# Patient Record
Sex: FEMALE | Race: ASIAN | NOT HISPANIC OR LATINO | URBAN - METROPOLITAN AREA
[De-identification: names, ages, dates, MRNs, and addresses within clinical notes are randomized per-mention and may not be internally consistent; named-entity substitution may affect disease eponyms.]

---

## 2018-10-03 ENCOUNTER — INPATIENT (INPATIENT)
Facility: HOSPITAL | Age: 83
LOS: 0 days | Discharge: HOME | DRG: 185 | End: 2018-10-04
Attending: SURGERY | Admitting: SURGERY
Payer: COMMERCIAL

## 2018-10-03 VITALS
DIASTOLIC BLOOD PRESSURE: 70 MMHG | SYSTOLIC BLOOD PRESSURE: 132 MMHG | OXYGEN SATURATION: 98 % | TEMPERATURE: 98 F | HEART RATE: 80 BPM | HEIGHT: 60 IN | RESPIRATION RATE: 18 BRPM | WEIGHT: 119.93 LBS

## 2018-10-03 DIAGNOSIS — S22.41XB: ICD-10-CM

## 2018-10-03 LAB
ALBUMIN SERPL ELPH-MCNC: 4 G/DL — SIGNIFICANT CHANGE UP (ref 3.3–5)
ALP SERPL-CCNC: 54 U/L — SIGNIFICANT CHANGE UP (ref 40–120)
ALT FLD-CCNC: 19 U/L — SIGNIFICANT CHANGE UP (ref 10–45)
ANION GAP SERPL CALC-SCNC: 10 MMOL/L — SIGNIFICANT CHANGE UP (ref 5–17)
APTT BLD: 29 SEC — SIGNIFICANT CHANGE UP (ref 27.5–37.4)
AST SERPL-CCNC: 28 U/L — SIGNIFICANT CHANGE UP (ref 10–40)
BASOPHILS # BLD AUTO: SIGNIFICANT CHANGE UP K/UL (ref 0–0.2)
BASOPHILS NFR BLD AUTO: SIGNIFICANT CHANGE UP % (ref 0–2)
BILIRUB SERPL-MCNC: 0.3 MG/DL — SIGNIFICANT CHANGE UP (ref 0.2–1.2)
BUN SERPL-MCNC: 17 MG/DL — SIGNIFICANT CHANGE UP (ref 7–23)
CALCIUM SERPL-MCNC: 9.6 MG/DL — SIGNIFICANT CHANGE UP (ref 8.4–10.5)
CHLORIDE SERPL-SCNC: 104 MMOL/L — SIGNIFICANT CHANGE UP (ref 96–108)
CO2 SERPL-SCNC: 26 MMOL/L — SIGNIFICANT CHANGE UP (ref 22–31)
CREAT SERPL-MCNC: 0.72 MG/DL — SIGNIFICANT CHANGE UP (ref 0.5–1.3)
EOSINOPHIL # BLD AUTO: SIGNIFICANT CHANGE UP K/UL (ref 0–0.5)
EOSINOPHIL NFR BLD AUTO: SIGNIFICANT CHANGE UP % (ref 0–6)
GLUCOSE SERPL-MCNC: 89 MG/DL — SIGNIFICANT CHANGE UP (ref 70–99)
HCT VFR BLD CALC: 39.7 % — SIGNIFICANT CHANGE UP (ref 34.5–45)
HGB BLD-MCNC: 13.6 G/DL — SIGNIFICANT CHANGE UP (ref 11.5–15.5)
INR BLD: 0.96 RATIO — SIGNIFICANT CHANGE UP (ref 0.88–1.16)
LYMPHOCYTES # BLD AUTO: SIGNIFICANT CHANGE UP % (ref 13–44)
LYMPHOCYTES # BLD AUTO: SIGNIFICANT CHANGE UP K/UL (ref 1–3.3)
MCHC RBC-ENTMCNC: 31 PG — SIGNIFICANT CHANGE UP (ref 27–34)
MCHC RBC-ENTMCNC: 34.3 GM/DL — SIGNIFICANT CHANGE UP (ref 32–36)
MCV RBC AUTO: 90.2 FL — SIGNIFICANT CHANGE UP (ref 80–100)
MONOCYTES # BLD AUTO: SIGNIFICANT CHANGE UP K/UL (ref 0–0.9)
MONOCYTES NFR BLD AUTO: SIGNIFICANT CHANGE UP % (ref 2–14)
NEUTROPHILS # BLD AUTO: SIGNIFICANT CHANGE UP K/UL (ref 1.8–7.4)
NEUTROPHILS NFR BLD AUTO: SIGNIFICANT CHANGE UP % (ref 43–77)
PLATELET # BLD AUTO: 250 K/UL — SIGNIFICANT CHANGE UP (ref 150–400)
POTASSIUM SERPL-MCNC: 3.9 MMOL/L — SIGNIFICANT CHANGE UP (ref 3.5–5.3)
POTASSIUM SERPL-SCNC: 3.9 MMOL/L — SIGNIFICANT CHANGE UP (ref 3.5–5.3)
PROT SERPL-MCNC: 7.7 G/DL — SIGNIFICANT CHANGE UP (ref 6–8.3)
PROTHROM AB SERPL-ACNC: 10.4 SEC — SIGNIFICANT CHANGE UP (ref 9.8–12.7)
RBC # BLD: 4.4 M/UL — SIGNIFICANT CHANGE UP (ref 3.8–5.2)
RBC # FLD: 12.3 % — SIGNIFICANT CHANGE UP (ref 10.3–14.5)
SODIUM SERPL-SCNC: 140 MMOL/L — SIGNIFICANT CHANGE UP (ref 135–145)
WBC # BLD: 7 K/UL — SIGNIFICANT CHANGE UP (ref 3.8–10.5)
WBC # FLD AUTO: 7 K/UL — SIGNIFICANT CHANGE UP (ref 3.8–10.5)

## 2018-10-03 PROCEDURE — 71045 X-RAY EXAM CHEST 1 VIEW: CPT | Mod: 26

## 2018-10-03 PROCEDURE — 99285 EMERGENCY DEPT VISIT HI MDM: CPT

## 2018-10-03 PROCEDURE — 99221 1ST HOSP IP/OBS SF/LOW 40: CPT

## 2018-10-03 PROCEDURE — 74177 CT ABD & PELVIS W/CONTRAST: CPT | Mod: 26

## 2018-10-03 PROCEDURE — 71260 CT THORAX DX C+: CPT | Mod: 26

## 2018-10-03 PROCEDURE — 73630 X-RAY EXAM OF FOOT: CPT | Mod: 26,RT

## 2018-10-03 RX ORDER — ACETAMINOPHEN 500 MG
1000 TABLET ORAL ONCE
Qty: 0 | Refills: 0 | Status: COMPLETED | OUTPATIENT
Start: 2018-10-03 | End: 2018-10-03

## 2018-10-03 RX ORDER — SODIUM CHLORIDE 9 MG/ML
1000 INJECTION INTRAMUSCULAR; INTRAVENOUS; SUBCUTANEOUS ONCE
Qty: 0 | Refills: 0 | Status: COMPLETED | OUTPATIENT
Start: 2018-10-03 | End: 2018-10-03

## 2018-10-03 RX ORDER — LIDOCAINE 4 G/100G
1 CREAM TOPICAL EVERY 24 HOURS
Qty: 0 | Refills: 0 | Status: DISCONTINUED | OUTPATIENT
Start: 2018-10-03 | End: 2018-10-04

## 2018-10-03 RX ORDER — SODIUM CHLORIDE 9 MG/ML
3 INJECTION INTRAMUSCULAR; INTRAVENOUS; SUBCUTANEOUS ONCE
Qty: 0 | Refills: 0 | Status: COMPLETED | OUTPATIENT
Start: 2018-10-03 | End: 2018-10-03

## 2018-10-03 RX ORDER — ENOXAPARIN SODIUM 100 MG/ML
40 INJECTION SUBCUTANEOUS DAILY
Qty: 0 | Refills: 0 | Status: DISCONTINUED | OUTPATIENT
Start: 2018-10-03 | End: 2018-10-04

## 2018-10-03 RX ORDER — ACETAMINOPHEN 500 MG
650 TABLET ORAL EVERY 6 HOURS
Qty: 0 | Refills: 0 | Status: DISCONTINUED | OUTPATIENT
Start: 2018-10-03 | End: 2018-10-04

## 2018-10-03 RX ORDER — TRAMADOL HYDROCHLORIDE 50 MG/1
25 TABLET ORAL EVERY 6 HOURS
Qty: 0 | Refills: 0 | Status: DISCONTINUED | OUTPATIENT
Start: 2018-10-03 | End: 2018-10-04

## 2018-10-03 RX ORDER — IBUPROFEN 200 MG
400 TABLET ORAL EVERY 6 HOURS
Qty: 0 | Refills: 0 | Status: DISCONTINUED | OUTPATIENT
Start: 2018-10-03 | End: 2018-10-04

## 2018-10-03 RX ADMIN — SODIUM CHLORIDE 1000 MILLILITER(S): 9 INJECTION INTRAMUSCULAR; INTRAVENOUS; SUBCUTANEOUS at 16:43

## 2018-10-03 RX ADMIN — SODIUM CHLORIDE 3 MILLILITER(S): 9 INJECTION INTRAMUSCULAR; INTRAVENOUS; SUBCUTANEOUS at 16:43

## 2018-10-03 RX ADMIN — Medication 400 MILLIGRAM(S): at 19:35
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
## 2018-10-03 NOTE — ED PROVIDER NOTE - CARE PLAN
Principal Discharge DX:	Open fracture of multiple ribs of right side, initial encounter  Assessment and plan of treatment:	Rest, increase activity as tolerated. REturn to the ER for any concerns such an uncontrolled pain shortness of breath , chest pain, dizziness or any other concerns  take tylenol for pain as needed  Ice packs to all sore areas  Use incentive spirometer 10x n hour while awake  follow up with your primary physician  in the next week Principal Discharge DX:	Rib fractures  Assessment and plan of treatment:	Rest, increase activity as tolerated. REturn to the ER for any concerns such an uncontrolled pain shortness of breath , chest pain, dizziness or any other concerns  take tylenol for pain as needed  Ice packs to all sore areas  Use incentive spirometer 10x n hour while awake  follow up with your primary physician  in the next week Principal Discharge DX:	Rib fractures  Goal:	R 4-6  Assessment and plan of treatment:	Rest, increase activity as tolerated. REturn to the ER for any concerns such an uncontrolled pain shortness of breath , chest pain, dizziness or any other concerns  take tylenol for pain as needed  Ice packs to all sore areas  Use incentive spirometer 10x n hour while awake  follow up with your primary physician  in the next week
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
## 2018-10-03 NOTE — ED PROVIDER NOTE - PLAN OF CARE
Rest, increase activity as tolerated. REturn to the ER for any concerns such an uncontrolled pain shortness of breath , chest pain, dizziness or any other concerns  take tylenol for pain as needed  Ice packs to all sore areas  Use incentive spirometer 10x n hour while awake  follow up with your primary physician  in the next week R 4-6

## 2018-10-04 ENCOUNTER — TRANSCRIPTION ENCOUNTER (OUTPATIENT)
Age: 83
End: 2018-10-04

## 2018-10-04 VITALS — SYSTOLIC BLOOD PRESSURE: 126 MMHG | HEART RATE: 59 BPM | DIASTOLIC BLOOD PRESSURE: 71 MMHG
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
## 2018-10-04 DIAGNOSIS — S22.41XA MULTIPLE FRACTURES OF RIBS, RIGHT SIDE, INITIAL ENCOUNTER FOR CLOSED FRACTURE: ICD-10-CM

## 2018-10-04 DIAGNOSIS — E05.90 THYROTOXICOSIS, UNSPECIFIED WITHOUT THYROTOXIC CRISIS OR STORM: ICD-10-CM

## 2018-10-04 DIAGNOSIS — Z29.9 ENCOUNTER FOR PROPHYLACTIC MEASURES, UNSPECIFIED: ICD-10-CM

## 2018-10-04 DIAGNOSIS — E78.5 HYPERLIPIDEMIA, UNSPECIFIED: ICD-10-CM

## 2018-10-04 DIAGNOSIS — Z79.899 OTHER LONG TERM (CURRENT) DRUG THERAPY: ICD-10-CM

## 2018-10-04 DIAGNOSIS — I10 ESSENTIAL (PRIMARY) HYPERTENSION: ICD-10-CM

## 2018-10-04 PROCEDURE — 85610 PROTHROMBIN TIME: CPT

## 2018-10-04 PROCEDURE — 99285 EMERGENCY DEPT VISIT HI MDM: CPT | Mod: 25

## 2018-10-04 PROCEDURE — 97161 PT EVAL LOW COMPLEX 20 MIN: CPT

## 2018-10-04 PROCEDURE — 85730 THROMBOPLASTIN TIME PARTIAL: CPT

## 2018-10-04 PROCEDURE — 71045 X-RAY EXAM CHEST 1 VIEW: CPT

## 2018-10-04 PROCEDURE — 80053 COMPREHEN METABOLIC PANEL: CPT

## 2018-10-04 PROCEDURE — 73630 X-RAY EXAM OF FOOT: CPT

## 2018-10-04 PROCEDURE — 71260 CT THORAX DX C+: CPT

## 2018-10-04 PROCEDURE — 74177 CT ABD & PELVIS W/CONTRAST: CPT

## 2018-10-04 PROCEDURE — 99223 1ST HOSP IP/OBS HIGH 75: CPT

## 2018-10-04 PROCEDURE — 85027 COMPLETE CBC AUTOMATED: CPT

## 2018-10-04 PROCEDURE — 96374 THER/PROPH/DIAG INJ IV PUSH: CPT | Mod: XU

## 2018-10-04 RX ORDER — SIMVASTATIN 20 MG/1
50 TABLET, FILM COATED ORAL
Qty: 0 | Refills: 0 | COMMUNITY

## 2018-10-04 RX ORDER — PROPYLTHIOURACIL 50 MG
1 TABLET ORAL
Qty: 0 | Refills: 0 | COMMUNITY

## 2018-10-04 RX ORDER — LIDOCAINE 4 G/100G
0 CREAM TOPICAL
Qty: 0 | Refills: 0 | COMMUNITY

## 2018-10-04 RX ORDER — TRAMADOL HYDROCHLORIDE 50 MG/1
0.5 TABLET ORAL
Qty: 10 | Refills: 0 | OUTPATIENT
Start: 2018-10-04

## 2018-10-04 RX ORDER — ACETAMINOPHEN 500 MG
1 TABLET ORAL
Qty: 0 | Refills: 0 | COMMUNITY

## 2018-10-04 RX ORDER — ASPIRIN/CALCIUM CARB/MAGNESIUM 324 MG
1 TABLET ORAL
Qty: 0 | Refills: 0 | COMMUNITY

## 2018-10-04 RX ORDER — ALENDRONATE SODIUM 70 MG/1
0 TABLET ORAL
Qty: 0 | Refills: 0 | COMMUNITY

## 2018-10-04 RX ORDER — IBUPROFEN 200 MG
1 TABLET ORAL
Qty: 0 | Refills: 0 | COMMUNITY
Start: 2018-10-04

## 2018-10-04 RX ORDER — ACETAMINOPHEN 500 MG
0 TABLET ORAL
Qty: 0 | Refills: 0 | COMMUNITY

## 2018-10-04 RX ADMIN — Medication 650 MILLIGRAM(S): at 09:44

## 2018-10-04 RX ADMIN — LIDOCAINE 1 PATCH: 4 CREAM TOPICAL at 00:58

## 2018-10-04 RX ADMIN — ENOXAPARIN SODIUM 40 MILLIGRAM(S): 100 INJECTION SUBCUTANEOUS at 12:03

## 2018-10-04 RX ADMIN — Medication 650 MILLIGRAM(S): at 09:14

## 2018-10-04 RX ADMIN — Medication 400 MILLIGRAM(S): at 00:06

## 2018-10-04 RX ADMIN — Medication 1000 MILLIGRAM(S): at 00:01

## 2018-10-04 RX ADMIN — Medication 400 MILLIGRAM(S): at 07:28

## 2018-10-04 RX ADMIN — Medication 400 MILLIGRAM(S): at 00:49

## 2018-10-04 RX ADMIN — LIDOCAINE 1 PATCH: 4 CREAM TOPICAL at 12:03

## 2018-10-04 RX ADMIN — Medication 400 MILLIGRAM(S): at 12:03

## 2018-10-04 RX ADMIN — Medication 400 MILLIGRAM(S): at 06:58

## 2018-10-04 NOTE — H&P ADULT - ATTENDING COMMENTS
Pt seen and examined. Agree with A/P. Admit to ATP, floor. Pain control, pulmonary toilet, OOB, diet, may need PT.

## 2018-10-04 NOTE — DISCHARGE NOTE ADULT - PATIENT PORTAL LINK FT
You can access the [facility] Patient Portal, offered by [facility], by registering with the following website: [email or web address]

## 2018-10-04 NOTE — DISCHARGE NOTE ADULT - PLAN OF CARE
Pain control, recovery 1.  Regular diet  2.  Activity as tolerated  3.  Follow-up with Dr. Kaur within 1-2 weeks.  Please call office for appointment.    Please follow up with your primary care physician.  Please schedule an appointment with your primary care provider within two weeks. 1.  Regular diet  2.  Activity as tolerated  3.  Follow-up with Dr. Kaur within 1-2 weeks.  Please call office for appointment.  Call office or return to emergency room if your pain is not controlled, if you are short of breath, or if you have chest pain.  Please follow up with your primary care physician.  Please schedule an appointment with your primary care provider within two weeks. 1.  Regular diet  2.  Activity as tolerated  3.  Follow-up with Dr. Kaur within 1-2 weeks.  Please call office for appointment.  Call office or return to emergency room if your pain is not controlled, if you are short of breath, or if you have chest pain.  Please follow up with your primary care physician.  Please schedule an appointment with your primary care provider within two weeks.    You may purchase over the counter lidoderm patches such as SalonPas for pain relief.

## 2018-10-04 NOTE — PHYSICAL THERAPY INITIAL EVALUATION ADULT - DISCHARGE DISPOSITION, PT EVAL
assist as needed especially on stair. Pt appears at functional baseline, no skilled PT required, Pt cleared for D/C from PT standpoint, SADIE Wright and SANGITA Worthy aware./home w/ assist

## 2018-10-04 NOTE — CONSULT NOTE ADULT - PROBLEM SELECTOR RECOMMENDATION 5
Spoke with pt's Pharmacy, [facility] in [place] ([phone]). Medications reconciled
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
## 2018-10-04 NOTE — H&P ADULT - NSHPLABSRESULTS_GEN_ALL_CORE
LABS:             12.6   5.9   )-----------( 238      ( 03 Oct 2018 17:02 )             37.7       10-03    140  |  104  |  17  ----------------------------<  89  3.9   |  26  |  0.72    Ca    9.6      03 Oct 2018 16:46    TPro  7.7  /  Alb  4.0  /  TBili  0.3  /  DBili  x   /  AST  28  /  ALT  19  /  AlkPhos  54  10-03    < from: CT Abdomen and Pelvis w/ IV Cont (10.03.18 @ 18:45) >    FINDINGS:    CHEST:     LUNGS AND LARGE AIRWAYS: Patent central airways. Bibasilar subsegmental   atelectasis. Left upper lobe calcified granuloma.  PLEURA: No pleural effusion.  VESSELS: Atherosclerotic calcifications.  HEART: Heart size is enlarged. Coronary artery calcifications. No   pericardial effusion.  MEDIASTINUM AND HARMONY: No lymphadenopathy.  CHEST WALL AND LOWER NECK: Enlarged and heterogeneous thyroid with   multiple subcentimeter nodules and substernal extension.    ABDOMEN AND PELVIS:    Multiple images are degraded secondary to motion.    LIVER: Subcentimeter hypodensity in right hepatic lobe is too small to   characterize.  BILE DUCTS: Normal caliber.  GALLBLADDER: Within normal limits.  SPLEEN: Within normal limits.  PANCREAS: Diffuse main pancreatic duct enlargement measuring up to 4 mm   in pancreatic body with gradual tapering towards the tail.   ADRENALS: Within normal limits.  KIDNEYS/URETERS: Within normal limits.    BLADDER: Within normal limits.  REPRODUCTIVE ORGANS: Hysterectomy.    BOWEL: No bowel obstruction. Cecal diverticulum. Appendix is not   visualized, however there are no regional inflammatory changes. Small   hiatal hernia.  PERITONEUM: No ascites.  VESSELS:  Atherosclerotic calcifications.  RETROPERITONEUM: No lymphadenopathy.    ABDOMINAL WALL: Calcified injection granulomas overlying the bilateral   gluteal musculature. Tiny fat-containing umbilical hernia.  BONES: Fractures of the right fourth through sixth anterior ribs.   Minimally displaced fracture involving the right anterior 5th rib.   Degenerative changes. Old healed bilateral posterior rib fractures. Age  indeterminant mild-moderate compression fracture deformity of L1   vertebral body without osseous retropulsion.    IMPRESSION:     Fractures of the right fourth through sixth anterior ribs. The right   fifth anterior rib fracture is minimally displaced.  No pneumothorax.    Compression deformity of the L1 vertebral body, of indeterminate age.    No acute intra-abdominal traumatic sequelae.    < end of copied text >
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
## 2018-10-04 NOTE — DISCHARGE NOTE ADULT - MEDICATION SUMMARY - MEDICATIONS TO TAKE
I will START or STAY ON the medications listed below when I get home from the hospital:    Aspirin Enteric Coated 81 mg oral delayed release tablet  -- 1 tab(s) by mouth once a day  -- Indication: For heart    Tylenol 500 mg oral tablet  -- 1  by mouth every 6 hours, As Needed  -- Indication: For pain    ibuprofen 400 mg oral tablet  -- 1 tab(s) by mouth every 6 hours  -- Indication: For pain    traMADol 50 mg oral tablet  -- 0.5 tab(s) by mouth every 6 hours, As needed, breakthrough pain (7-10) MDD:4  -- Indication: For pain    simvastatin  -- 50 milligram(s) by mouth once a day  -- Indication: For cholesterol    lisinopril-hydroCHLOROthiazide 20 mg-25 mg oral tablet  -- 1 tab(s) by mouth once a day  -- Indication: For blood pressure    propylthiouracil 50 mg oral tablet  -- 1 tab(s) by mouth 2 times a day  -- Indication: For thyroid    alendronate weekly  -- Indication: For bones    lidocaine topical  -- Indication: For pain

## 2018-10-04 NOTE — DISCHARGE NOTE ADULT - CARE PLAN
Principal Discharge DX:	Rib fractures  Goal:	Pain control, recovery  Assessment and plan of treatment:	1.  Regular diet  2.  Activity as tolerated  3.  Follow-up with Dr. Kaur within 1-2 weeks.  Please call office for appointment.    Please follow up with your primary care physician.  Please schedule an appointment with your primary care provider within two weeks. Principal Discharge DX:	Rib fractures  Goal:	Pain control, recovery  Assessment and plan of treatment:	1.  Regular diet  2.  Activity as tolerated  3.  Follow-up with Dr. Kaur within 1-2 weeks.  Please call office for appointment.  Call office or return to emergency room if your pain is not controlled, if you are short of breath, or if you have chest pain.  Please follow up with your primary care physician.  Please schedule an appointment with your primary care provider within two weeks. Principal Discharge DX:	Rib fractures  Goal:	Pain control, recovery  Assessment and plan of treatment:	1.  Regular diet  2.  Activity as tolerated  3.  Follow-up with Dr. Kaur within 1-2 weeks.  Please call office for appointment.  Call office or return to emergency room if your pain is not controlled, if you are short of breath, or if you have chest pain.  Please follow up with your primary care physician.  Please schedule an appointment with your primary care provider within two weeks.    You may purchase over the counter lidoderm patches such as SalonPas for pain relief.

## 2018-10-04 NOTE — PHYSICAL THERAPY INITIAL EVALUATION ADULT - ADDITIONAL COMMENTS
Pt and grandson report pt lives with her family in a single level private home with 1 step to enter. Pt reports she uses a large flower pot as a handrail to negotiate the step. Pt was independent with single axis cane with all functional mobility and ADLs prior to admit.

## 2018-10-04 NOTE — DISCHARGE NOTE ADULT - CARE PROVIDER_API CALL
Juan Kaur), Surgery; Surgical Critical Care  [address] [place] [id number]  Phone: [phone]  Fax: [phone]
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
## 2018-10-04 NOTE — PHYSICAL THERAPY INITIAL EVALUATION ADULT - GENERAL OBSERVATIONS, REHAB EVAL
Pt tolerated 45min PT initial evaluation well, Pt with multiple rib fx full weight bearing. Pt rec'd semisupine in bed in NAD, +IVL grandson bedside translating.

## 2018-10-04 NOTE — H&P ADULT - NSHPPHYSICALEXAM_GEN_ALL_CORE
Vital Signs Last 24 Hrs  T(C): 36.8 (04 Oct 2018 04:51), Max: 36.8 (03 Oct 2018 23:31)  T(F): 98.3 (04 Oct 2018 04:51), Max: 98.3 (04 Oct 2018 04:51)  HR: 62 (04 Oct 2018 04:51) (59 - 80)  BP: 130/62 (04 Oct 2018 04:51) (130/62 - 159/72)  BP(mean): --  RR: 18 (04 Oct 2018 04:51) (17 - 18)  SpO2: 98% (04 Oct 2018 04:51) (97% - 99%)    Physical Exam:  General: Well developed, well nourished, No Acute Distress  HEENT: Normocephalic Atraumatic, EOMI bl  Neurology: A&Ox3  Abdominal: Soft, Non-Tender, Non-Distended  Extremities: No Clubbing, cyanosis or edema, FROM  Skin: warm, dry, normal color, no rash or abnormal lesions

## 2018-10-12 PROBLEM — Z00.00 ENCOUNTER FOR PREVENTIVE HEALTH EXAMINATION: Status: ACTIVE | Noted: 2018-10-12

## 2018-10-15 ENCOUNTER — APPOINTMENT (OUTPATIENT)
Dept: TRAUMA SURGERY | Facility: CLINIC | Age: 83
End: 2018-10-15